# Patient Record
Sex: MALE | Race: BLACK OR AFRICAN AMERICAN | NOT HISPANIC OR LATINO | Employment: OTHER | URBAN - METROPOLITAN AREA
[De-identification: names, ages, dates, MRNs, and addresses within clinical notes are randomized per-mention and may not be internally consistent; named-entity substitution may affect disease eponyms.]

---

## 2019-09-23 ENCOUNTER — OFFICE VISIT (OUTPATIENT)
Dept: PHYSICAL MEDICINE AND REHAB | Facility: CLINIC | Age: 69
End: 2019-09-23
Payer: MEDICARE

## 2019-09-23 VITALS
OXYGEN SATURATION: 91 % | WEIGHT: 273 LBS | TEMPERATURE: 97 F | SYSTOLIC BLOOD PRESSURE: 130 MMHG | HEART RATE: 62 BPM | HEIGHT: 68 IN | BODY MASS INDEX: 41.37 KG/M2 | DIASTOLIC BLOOD PRESSURE: 80 MMHG

## 2019-09-23 DIAGNOSIS — E11.65 TYPE 2 DIABETES MELLITUS WITH HYPERGLYCEMIA, WITHOUT LONG-TERM CURRENT USE OF INSULIN: ICD-10-CM

## 2019-09-23 DIAGNOSIS — Z74.09 IMPAIRED MOBILITY: ICD-10-CM

## 2019-09-23 DIAGNOSIS — I50.9 CONGESTIVE HEART FAILURE, UNSPECIFIED HF CHRONICITY, UNSPECIFIED HEART FAILURE TYPE: ICD-10-CM

## 2019-09-23 DIAGNOSIS — I69.30 LATE EFFECT OF ISCHEMIC CEREBRAL STROKE: ICD-10-CM

## 2019-09-23 DIAGNOSIS — I25.10 CORONARY ARTERY DISEASE INVOLVING NATIVE CORONARY ARTERY, ANGINA PRESENCE UNSPECIFIED, UNSPECIFIED WHETHER NATIVE OR TRANSPLANTED HEART: ICD-10-CM

## 2019-09-23 DIAGNOSIS — R47.1 DYSARTHRIA: ICD-10-CM

## 2019-09-23 DIAGNOSIS — Z91.81 AT LOW RISK FOR FALL: ICD-10-CM

## 2019-09-23 DIAGNOSIS — R53.1 ASTHENIA: Primary | ICD-10-CM

## 2019-09-23 PROCEDURE — 99213 OFFICE O/P EST LOW 20 MIN: CPT | Mod: S$GLB,,, | Performed by: NURSE PRACTITIONER

## 2019-09-23 PROCEDURE — 99213 PR OFFICE/OUTPT VISIT, EST, LEVL III, 20-29 MIN: ICD-10-PCS | Mod: S$GLB,,, | Performed by: NURSE PRACTITIONER

## 2019-09-23 NOTE — PROGRESS NOTES
Subjective:       Patient ID: Bethany Levy Jr. is a 69 y.o. male.    Chief Complaint: Follow-up (Went 8/13/19 Er did procedure blood pressure dropped and cought an infection was sent straight to ICU. Got out of  rehab 9/11/2019. )    HPI     69-year-old black male admitted to [facility] 8/14/2019 with complaints of increasing shortness of breath, abdominal pain.  Chest x-ray revealed bilateral basilar infiltrates (pneumonia) in addition to sepsis/septic shock.  Required ICU management.  Also developed acute kidney injury seen by nephrology Dr. Olvera.  Alex Guaman followed for pulmonology and Dr. Collins Ospina for cardiology.  Evaluated by GI Dr. Onofre with the findings of gastritis/duodenitis comorbidities include atrial fibrillation, hypertension, chronic obstructive pulmonary disease, gastroesophageal reflux disease, diabetes mellitus type 2 with hyperglycemia, CAM/CKD history of dysphagia, obstructive sleep apnea (CPAP) asthma and chronic lymphedema bilateral lower extremities..  History of possible remote stroke.  He was able to participate with PT OT speech therapy interventions, struggled to regain prior level of function.  Requires continued intensive interdisciplinary inpatient rehabilitation with close medical monitoring during remobilization. He has since transitioned to home. Working w/ PT/OT thru [facility]. Regaining strength. Able to complete all ADLs independently since his discharge.     Review of Systems   Constitutional: Negative for activity change, appetite change, chills, fatigue and fever.   Respiratory: Negative for apnea, cough, chest tightness, shortness of breath and wheezing.    Cardiovascular: Positive for leg swelling. Negative for chest pain and palpitations.   Gastrointestinal: Negative for abdominal distention, abdominal pain, blood in stool, constipation, diarrhea and vomiting.   Genitourinary: Negative for difficulty urinating, dysuria, flank pain, frequency  and urgency.   Musculoskeletal: Negative for arthralgias, back pain, gait problem, joint swelling and neck pain.   Skin: Negative for color change, pallor and rash.   Neurological: Negative for dizziness, tremors, syncope, weakness, light-headedness and numbness.   Hematological: Negative for adenopathy. Does not bruise/bleed easily.   Psychiatric/Behavioral: Negative for agitation, confusion and sleep disturbance. The patient is not nervous/anxious.        Objective:      Physical Exam   Constitutional: He is oriented to person, place, and time. Vital signs are normal. He appears well-developed and well-nourished.   HENT:   Head: Normocephalic and atraumatic.   Mouth/Throat: Oropharynx is clear and moist.   Eyes: Pupils are equal, round, and reactive to light. Conjunctivae are normal.   Neck: Trachea normal and normal range of motion. Neck supple. Carotid bruit is not present.   Cardiovascular: Normal rate, regular rhythm and normal heart sounds.   Pulmonary/Chest: Effort normal and breath sounds normal.   Abdominal: Soft. Bowel sounds are normal.   Musculoskeletal: Normal range of motion. He exhibits edema (2+ bilateral lower extrimities).   Neurological: He is alert and oriented to person, place, and time. He displays no atrophy and no tremor. He exhibits normal muscle tone. Coordination normal.   Marked dysarthria   Skin: Skin is warm, dry and intact.   Psychiatric: He has a normal mood and affect. His speech is normal and behavior is normal. Judgment normal.       Assessment:       1. Asthenia    2. Late effect of ischemic cerebral stroke    3. Dysarthria    4. Coronary artery disease involving native coronary artery, angina presence unspecified, unspecified whether native or transplanted heart    5. Type 2 diabetes mellitus with hyperglycemia, without long-term current use of insulin    6. Congestive heart failure, unspecified HF chronicity, unspecified heart failure type    7. Impaired mobility    8. At low  risk for fall        Plan:       PROBLEM LIST     Bethany was seen today for follow-up.    Diagnoses and all orders for this visit:    Asthenia    Late effect of ischemic cerebral stroke    Dysarthria    Coronary artery disease involving native coronary artery, angina presence unspecified, unspecified whether native or transplanted heart    Type 2 diabetes mellitus with hyperglycemia, without long-term current use of insulin    Congestive heart failure, unspecified HF chronicity, unspecified heart failure type    Impaired mobility    At low risk for fall    Improvement in strength and physical capacity. Continue PT/OT through [facility]. Contact PCP regarding LE swelling which could be exacerbation of CHF.

## 2020-01-13 ENCOUNTER — OFFICE VISIT (OUTPATIENT)
Dept: UROLOGY | Facility: CLINIC | Age: 70
End: 2020-01-13
Payer: MEDICARE

## 2020-01-13 VITALS
BODY MASS INDEX: 42.74 KG/M2 | HEIGHT: 68 IN | RESPIRATION RATE: 18 BRPM | SYSTOLIC BLOOD PRESSURE: 141 MMHG | HEART RATE: 70 BPM | WEIGHT: 282 LBS | DIASTOLIC BLOOD PRESSURE: 76 MMHG

## 2020-01-13 DIAGNOSIS — N40.1 BPH WITH OBSTRUCTION/LOWER URINARY TRACT SYMPTOMS: ICD-10-CM

## 2020-01-13 DIAGNOSIS — N50.89 SCROTAL EDEMA: Primary | ICD-10-CM

## 2020-01-13 DIAGNOSIS — N13.8 BPH WITH OBSTRUCTION/LOWER URINARY TRACT SYMPTOMS: ICD-10-CM

## 2020-01-13 PROCEDURE — 99214 PR OFFICE/OUTPT VISIT, EST, LEVL IV, 30-39 MIN: ICD-10-PCS | Mod: S$GLB,,, | Performed by: NURSE PRACTITIONER

## 2020-01-13 PROCEDURE — 99214 OFFICE O/P EST MOD 30 MIN: CPT | Mod: S$GLB,,, | Performed by: NURSE PRACTITIONER

## 2020-01-13 PROCEDURE — 1159F MED LIST DOCD IN RCRD: CPT | Mod: S$GLB,,, | Performed by: NURSE PRACTITIONER

## 2020-01-13 PROCEDURE — 1159F PR MEDICATION LIST DOCUMENTED IN MEDICAL RECORD: ICD-10-PCS | Mod: S$GLB,,, | Performed by: NURSE PRACTITIONER

## 2020-01-13 PROCEDURE — 1126F PR PAIN SEVERITY QUANTIFIED, NO PAIN PRESENT: ICD-10-PCS | Mod: S$GLB,,, | Performed by: NURSE PRACTITIONER

## 2020-01-13 PROCEDURE — 1126F AMNT PAIN NOTED NONE PRSNT: CPT | Mod: S$GLB,,, | Performed by: NURSE PRACTITIONER

## 2020-01-13 RX ORDER — TAMSULOSIN HYDROCHLORIDE 0.4 MG/1
0.4 CAPSULE ORAL DAILY
COMMUNITY
End: 2020-09-15 | Stop reason: SDUPTHER

## 2020-01-13 NOTE — PROGRESS NOTES
Chief Complaint:   Chief Complaint   Patient presents with    Other     swollen groin area/no pain but has been going on  for a while       HPI:  69-year-old  male known to Dr. Nelson who presents with complaints of swollen groin area.  Patient states that it may be related to extra fluid as he has been started on diuretic therapy and has a history of chronic lower extremity edema. Since his last visit with us, he was hospitalized for sepsis due to pneumonia requiring ICU management and short stay in rehab for PT/OT.  He is now back at home living alone in performing ADLs independently.    Patient has BPH with lower urinary tract symptoms, maintained on Flomax.  He feels like he empties his bladder completely.  LADI was performed in April 2019 and was benign.  PSA was also obtained at that time and it was 0.6.  He denies any other urinary complaints today.    Allergies:  Patient has no known allergies.    Medications:  has a current medication list which includes the following prescription(s): tamsulosin, ammonium lactate, apixaban, atorvastatin, benzonatate, diltiazem, metformin, metoprolol tartrate, spironolactone, and torsemide.    Review of Systems:  General: No fever, chills, vision changes, dizziness, weakness, fatigue, unexplained weight loss, confusion, or mood swings.  Skin: No rashes, itching, or changes in color/texture of skin.  Chest: Denies JORGE, cyanosis, wheezing, cough, and sputum production.  Abdomen: Appetite fine. Denies diarrhea, abdominal pain, hematemesis, or blood in stool.  Musculoskeletal: No joint stiffness or swelling. Denies back pain.  : As above.  All other review of systems negative.    PMH:   has a past medical history of Atrial fibrillation, CHF (congestive heart failure), COPD (chronic obstructive pulmonary disease), Coronary artery disease, Diabetes mellitus, GERD (gastroesophageal reflux disease), Hypertension, and PVD (peripheral vascular disease).    PSH:    has a past surgical history that includes Cholecystectomy.    FamHx: Family history is unknown by patient.    SocHx:  reports that he has never smoked. He has never used smokeless tobacco. He reports that he does not drink alcohol or use drugs.      Physical Exam:  Vitals:    01/13/20 1426   BP: (!) 141/76   Pulse: 70   Resp: 18     General: AAOx3, no apparent distress, expressive aphasia intermittently  Neck: supple, no masses, normal thyroid  Lungs: normal inspiration, no use of accessory muscles  Heart: regular rate and rhythm, no arrhythmias  Abdomen: soft, NT, ND, no masses, no hernias, no hepatosplenomegaly  Lymphatic: no unusually enlarged or tender lymph nodes  Skin: warm and dry, no jaundice.  Ext: LLE 3+ pitting edema, RLE 2+ pitting edema, ambulating independently.  : test desc benjamin, scrotal swelling, no abnormalities of epididymus    Labs/Studies: none    Impression/Plan:   Scrotal edema  Comments:  associated with chronic lower extremity edema, educated, instructed to wear supportive undergarments, continue meds as prescribed    BPH with obstruction/lower urinary tract symptoms  Comments:  stable, maintained on Flomax, LADI and PSA due April 2020        Follow up in about 6 months (around 7/13/2020).

## 2020-07-13 ENCOUNTER — OFFICE VISIT (OUTPATIENT)
Dept: UROLOGY | Facility: CLINIC | Age: 70
End: 2020-07-13
Payer: MEDICARE

## 2020-07-13 VITALS
DIASTOLIC BLOOD PRESSURE: 78 MMHG | HEIGHT: 68 IN | BODY MASS INDEX: 42.74 KG/M2 | SYSTOLIC BLOOD PRESSURE: 165 MMHG | HEART RATE: 55 BPM | RESPIRATION RATE: 18 BRPM | WEIGHT: 282 LBS

## 2020-07-13 DIAGNOSIS — N13.8 BPH WITH OBSTRUCTION/LOWER URINARY TRACT SYMPTOMS: Primary | ICD-10-CM

## 2020-07-13 DIAGNOSIS — N40.1 BPH WITH OBSTRUCTION/LOWER URINARY TRACT SYMPTOMS: Primary | ICD-10-CM

## 2020-07-13 DIAGNOSIS — N50.89 SCROTAL SWELLING: ICD-10-CM

## 2020-07-13 LAB — PSA, DIAGNOSTIC: 0.59 NG/ML (ref 0–4)

## 2020-07-13 PROCEDURE — 99213 OFFICE O/P EST LOW 20 MIN: CPT | Mod: S$GLB,,, | Performed by: SPECIALIST

## 2020-07-13 PROCEDURE — 99213 PR OFFICE/OUTPT VISIT, EST, LEVL III, 20-29 MIN: ICD-10-PCS | Mod: S$GLB,,, | Performed by: SPECIALIST

## 2020-07-13 RX ORDER — TEMAZEPAM 15 MG/1
15 CAPSULE ORAL NIGHTLY
COMMUNITY
Start: 2020-06-10

## 2020-07-13 RX ORDER — DILTIAZEM HYDROCHLORIDE 240 MG/1
CAPSULE, EXTENDED RELEASE ORAL
COMMUNITY
Start: 2020-06-23 | End: 2021-07-16

## 2020-07-13 RX ORDER — IPRATROPIUM BROMIDE AND ALBUTEROL SULFATE 2.5; .5 MG/3ML; MG/3ML
SOLUTION RESPIRATORY (INHALATION)
COMMUNITY
Start: 2020-07-05

## 2020-07-14 ENCOUNTER — TELEPHONE (OUTPATIENT)
Dept: UROLOGY | Facility: CLINIC | Age: 70
End: 2020-07-14

## 2020-07-14 NOTE — TELEPHONE ENCOUNTER
Pt contacted. N/A and msg left on VM to return call.     ----- Message from Hema Nelson MD sent at 7/14/2020  3:25 PM CDT -----  PSA of 0.59 ng/mL is good and stable for his age.  Inform him of the results.  Remind him to continue to do scrotal elevation when he is sitting down or laying down and to continue to wear really tight undergarments when he is walking around.

## 2020-07-15 ENCOUNTER — TELEPHONE (OUTPATIENT)
Dept: UROLOGY | Facility: CLINIC | Age: 70
End: 2020-07-15

## 2020-07-15 NOTE — TELEPHONE ENCOUNTER
Spoke with pt and advised of PSA result 0.59 which is stable and good for his age per MIKE. Nurse also advised pt to wear tight fitting underwear while walking around and to continue with scrotal evaluation. Pt verbalized understanding. NB

## 2020-09-15 ENCOUNTER — TELEPHONE (OUTPATIENT)
Dept: UROLOGY | Facility: CLINIC | Age: 70
End: 2020-09-15

## 2020-09-15 DIAGNOSIS — N40.1 BPH WITH OBSTRUCTION/LOWER URINARY TRACT SYMPTOMS: Primary | ICD-10-CM

## 2020-09-15 DIAGNOSIS — N13.8 BPH WITH OBSTRUCTION/LOWER URINARY TRACT SYMPTOMS: Primary | ICD-10-CM

## 2020-09-15 RX ORDER — TAMSULOSIN HYDROCHLORIDE 0.4 MG/1
0.4 CAPSULE ORAL DAILY
Qty: 30 CAPSULE | Refills: 11 | Status: CANCELLED | OUTPATIENT
Start: 2020-09-15 | End: 2020-10-15

## 2020-09-15 RX ORDER — TAMSULOSIN HYDROCHLORIDE 0.4 MG/1
0.4 CAPSULE ORAL DAILY
Qty: 30 CAPSULE | Refills: 5 | Status: SHIPPED | OUTPATIENT
Start: 2020-09-15 | End: 2021-11-01

## 2020-09-15 NOTE — TELEPHONE ENCOUNTER
----- Message from Fabián Blankenship sent at 9/15/2020  9:42 AM CDT -----  Regarding: Medication question  Please call [facility] to discuss a medication question he has about Flomax [phone].

## 2020-09-15 NOTE — TELEPHONE ENCOUNTER
MA spoke with patient, he was admitting to urinary frequency and out of his Flomax, will send refill to updated pharmacy as he is evacuated.

## 2021-01-14 ENCOUNTER — OFFICE VISIT (OUTPATIENT)
Dept: UROLOGY | Facility: CLINIC | Age: 71
End: 2021-01-14
Payer: MEDICARE

## 2021-01-14 VITALS
HEIGHT: 68 IN | WEIGHT: 280 LBS | SYSTOLIC BLOOD PRESSURE: 136 MMHG | HEART RATE: 85 BPM | BODY MASS INDEX: 42.44 KG/M2 | DIASTOLIC BLOOD PRESSURE: 72 MMHG

## 2021-01-14 DIAGNOSIS — N13.8 BPH WITH OBSTRUCTION/LOWER URINARY TRACT SYMPTOMS: Primary | ICD-10-CM

## 2021-01-14 DIAGNOSIS — N50.89 SCROTAL SWELLING: ICD-10-CM

## 2021-01-14 DIAGNOSIS — N40.1 BPH WITH OBSTRUCTION/LOWER URINARY TRACT SYMPTOMS: Primary | ICD-10-CM

## 2021-01-14 DIAGNOSIS — R60.0 LOWER EXTREMITY EDEMA: ICD-10-CM

## 2021-01-14 LAB — POC RESIDUAL URINE VOLUME: 40 ML (ref 0–100)

## 2021-01-14 PROCEDURE — 99213 PR OFFICE/OUTPT VISIT, EST, LEVL III, 20-29 MIN: ICD-10-PCS | Mod: S$GLB,,, | Performed by: SPECIALIST

## 2021-01-14 PROCEDURE — 99213 OFFICE O/P EST LOW 20 MIN: CPT | Mod: S$GLB,,, | Performed by: SPECIALIST

## 2021-01-14 PROCEDURE — 51798 POCT BLADDER SCAN: ICD-10-PCS | Mod: S$GLB,,, | Performed by: SPECIALIST

## 2021-01-14 PROCEDURE — 51798 US URINE CAPACITY MEASURE: CPT | Mod: S$GLB,,, | Performed by: SPECIALIST

## 2021-01-14 RX ORDER — LOSARTAN POTASSIUM 50 MG/1
50 TABLET ORAL DAILY
COMMUNITY
Start: 2020-12-17

## 2021-01-14 RX ORDER — ESCITALOPRAM OXALATE 10 MG/1
10 TABLET ORAL DAILY
COMMUNITY
Start: 2020-12-26

## 2021-01-14 RX ORDER — TRAZODONE HYDROCHLORIDE 50 MG/1
50 TABLET ORAL NIGHTLY
COMMUNITY
Start: 2020-12-22

## 2021-01-14 RX ORDER — FUROSEMIDE 40 MG/1
40 TABLET ORAL DAILY
COMMUNITY
Start: 2020-12-23

## 2021-01-14 RX ORDER — ZOLPIDEM TARTRATE 10 MG/1
10 TABLET ORAL NIGHTLY
COMMUNITY
Start: 2020-11-08

## 2021-01-14 RX ORDER — LEVOFLOXACIN 500 MG/1
500 TABLET, FILM COATED ORAL DAILY
COMMUNITY
Start: 2020-12-17

## 2021-07-16 ENCOUNTER — OFFICE VISIT (OUTPATIENT)
Dept: UROLOGY | Facility: CLINIC | Age: 71
End: 2021-07-16
Payer: MEDICARE

## 2021-07-16 VITALS
DIASTOLIC BLOOD PRESSURE: 68 MMHG | SYSTOLIC BLOOD PRESSURE: 147 MMHG | BODY MASS INDEX: 40.16 KG/M2 | HEART RATE: 85 BPM | WEIGHT: 265 LBS | HEIGHT: 68 IN

## 2021-07-16 DIAGNOSIS — N52.9 ERECTILE DYSFUNCTION, UNSPECIFIED ERECTILE DYSFUNCTION TYPE: ICD-10-CM

## 2021-07-16 DIAGNOSIS — N40.1 BPH WITH OBSTRUCTION/LOWER URINARY TRACT SYMPTOMS: Primary | ICD-10-CM

## 2021-07-16 DIAGNOSIS — N50.89 SCROTAL SWELLING: ICD-10-CM

## 2021-07-16 DIAGNOSIS — N13.8 BPH WITH OBSTRUCTION/LOWER URINARY TRACT SYMPTOMS: Primary | ICD-10-CM

## 2021-07-16 LAB — PSA, DIAGNOSTIC: 1.45 NG/ML (ref 0–4)

## 2021-07-16 PROCEDURE — 99213 OFFICE O/P EST LOW 20 MIN: CPT | Mod: S$GLB,,, | Performed by: NURSE PRACTITIONER

## 2021-07-16 PROCEDURE — 36415 PR COLLECTION VENOUS BLOOD,VENIPUNCTURE: ICD-10-PCS | Mod: S$GLB,,, | Performed by: NURSE PRACTITIONER

## 2021-07-16 PROCEDURE — 51798 PR MEAS,POST-VOID RES,US,NON-IMAGING: ICD-10-PCS | Mod: S$GLB,,, | Performed by: NURSE PRACTITIONER

## 2021-07-16 PROCEDURE — 51798 US URINE CAPACITY MEASURE: CPT | Mod: S$GLB,,, | Performed by: NURSE PRACTITIONER

## 2021-07-16 PROCEDURE — 36415 COLL VENOUS BLD VENIPUNCTURE: CPT | Mod: S$GLB,,, | Performed by: NURSE PRACTITIONER

## 2021-07-16 PROCEDURE — 99213 PR OFFICE/OUTPT VISIT, EST, LEVL III, 20-29 MIN: ICD-10-PCS | Mod: S$GLB,,, | Performed by: NURSE PRACTITIONER

## 2021-07-16 RX ORDER — CLOPIDOGREL BISULFATE 75 MG/1
75 TABLET ORAL DAILY
COMMUNITY
Start: 2021-06-13

## 2021-07-16 RX ORDER — DILTIAZEM HYDROCHLORIDE 240 MG/1
240 CAPSULE, COATED, EXTENDED RELEASE ORAL EVERY MORNING
COMMUNITY
Start: 2021-06-17

## 2021-07-16 RX ORDER — METOLAZONE 5 MG/1
TABLET ORAL
COMMUNITY
Start: 2021-06-22

## 2021-07-16 RX ORDER — POTASSIUM CHLORIDE 20 MEQ/1
20 TABLET, EXTENDED RELEASE ORAL 2 TIMES DAILY
COMMUNITY
Start: 2021-06-13

## 2021-07-19 ENCOUNTER — TELEPHONE (OUTPATIENT)
Dept: UROLOGY | Facility: CLINIC | Age: 71
End: 2021-07-19

## 2021-08-02 ENCOUNTER — TELEPHONE (OUTPATIENT)
Dept: UROLOGY | Facility: CLINIC | Age: 71
End: 2021-08-02

## 2021-08-03 ENCOUNTER — PROCEDURE VISIT (OUTPATIENT)
Dept: UROLOGY | Facility: CLINIC | Age: 71
End: 2021-08-03
Payer: MEDICARE

## 2021-08-03 VITALS
OXYGEN SATURATION: 92 % | HEIGHT: 68 IN | HEART RATE: 85 BPM | SYSTOLIC BLOOD PRESSURE: 195 MMHG | BODY MASS INDEX: 40.84 KG/M2 | RESPIRATION RATE: 18 BRPM | DIASTOLIC BLOOD PRESSURE: 109 MMHG | WEIGHT: 269.5 LBS

## 2021-08-03 DIAGNOSIS — N40.1 BPH WITH OBSTRUCTION/LOWER URINARY TRACT SYMPTOMS: Primary | ICD-10-CM

## 2021-08-03 DIAGNOSIS — N13.8 BPH WITH OBSTRUCTION/LOWER URINARY TRACT SYMPTOMS: ICD-10-CM

## 2021-08-03 DIAGNOSIS — N40.1 BPH WITH OBSTRUCTION/LOWER URINARY TRACT SYMPTOMS: ICD-10-CM

## 2021-08-03 DIAGNOSIS — N13.8 BPH WITH OBSTRUCTION/LOWER URINARY TRACT SYMPTOMS: Primary | ICD-10-CM

## 2021-08-03 PROCEDURE — 52000 CYSTOURETHROSCOPY: CPT | Mod: S$GLB,,, | Performed by: UROLOGY

## 2021-08-03 PROCEDURE — 52000 CYSTOSCOPY: ICD-10-PCS | Mod: S$GLB,,, | Performed by: UROLOGY

## 2021-08-03 RX ORDER — FINASTERIDE 5 MG/1
5 TABLET, FILM COATED ORAL DAILY
Qty: 30 TABLET | Refills: 11 | Status: SHIPPED | OUTPATIENT
Start: 2021-08-03 | End: 2022-08-03

## 2021-08-03 RX ORDER — CIPROFLOXACIN 500 MG/1
500 TABLET ORAL
Status: COMPLETED | OUTPATIENT
Start: 2021-08-03 | End: 2021-08-03

## 2021-08-03 RX ADMIN — CIPROFLOXACIN 500 MG: 500 TABLET ORAL at 04:08

## 2024-09-21 NOTE — PROGRESS NOTES
Subjective:       Patient ID: Bethany Levy Jr. is a 69 y.o. male.    Chief Complaint: Follow-up (6mth f/u), Groin Pain (swollen approx 6mths+), and Urinary Frequency (approx over one year)      HPI:  69-year-old man who is here for six-month follow-up.  He has BPH and lower tract symptoms.  He is being managed with tamsulosin.  He is do a digital rectal exam and PSA check today.  Denies any blood in his urine.  Denies any bothersome LUTS    He is reporting scrotal swelling.  Of note he also has lower extremity edema.  He also has abdominal distention.  He is in the chronic state of fluid retention and he is dependent on Lasix.    Past Medical History:   Past Medical History:   Diagnosis Date    Atrial fibrillation     CHF (congestive heart failure)     COPD (chronic obstructive pulmonary disease)     Coronary artery disease     Diabetes mellitus     GERD (gastroesophageal reflux disease)     Hypertension     PVD (peripheral vascular disease)        Past Surgical Historical:   Past Surgical History:   Procedure Laterality Date    CHOLECYSTECTOMY          Medications:   Medication List with Changes/Refills   Current Medications    ALBUTEROL-IPRATROPIUM (DUO-NEB) 2.5 MG-0.5 MG/3 ML NEBULIZER SOLUTION    USE 1 SOLUTION IN NEBULIZER THREE TIMES DAILY    AMMONIUM LACTATE (LAC-HYDRIN) 12 % LOTION    Apply topically 2 (two) times daily as needed for Dry Skin.    APIXABAN 5 MG TAB    Take 1 tablet (5 mg total) by mouth 2 (two) times daily.    ATORVASTATIN (LIPITOR) 40 MG TABLET    Take 1 tablet (40 mg total) by mouth once daily.    BENZONATATE (TESSALON) 200 MG CAPSULE    Take 200 mg by mouth 3 (three) times daily as needed for Cough.    DILTIAZEM (CARDIZEM CD) 120 MG CP24    Take 1 capsule (120 mg total) by mouth once daily.    DILTIAZEM (TIAZAC) 240 MG CS24    TAKE 1 CAPSULE BY MOUTH ONCE DAILY IN THE MORNING    METFORMIN (GLUCOPHAGE-XR) 750 MG 24 HR TABLET    Take 750 mg by mouth daily with breakfast.     METOPROLOL TARTRATE (LOPRESSOR) 25 MG TABLET    Take 1 tablet (25 mg total) by mouth 2 (two) times daily.    SPIRONOLACTONE (ALDACTONE) 50 MG TABLET    Take 50 mg by mouth once daily.    TAMSULOSIN (FLOMAX) 0.4 MG CAP    Take 0.4 mg by mouth once daily.    TEMAZEPAM (RESTORIL) 15 MG CAP    Take 15 mg by mouth every evening.    TORSEMIDE (DEMADEX) 10 MG TAB    Take 3 tablets (30 mg total) by mouth once daily.        Past Social History:   Social History     Socioeconomic History    Marital status:      Spouse name: Not on file    Number of children: Not on file    Years of education: Not on file    Highest education level: Not on file   Occupational History    Not on file   Social Needs    Financial resource strain: Not on file    Food insecurity     Worry: Not on file     Inability: Not on file    Transportation needs     Medical: Not on file     Non-medical: Not on file   Tobacco Use    Smoking status: Never Smoker    Smokeless tobacco: Never Used   Substance and Sexual Activity    Alcohol use: No    Drug use: No    Sexual activity: Not on file   Lifestyle    Physical activity     Days per week: Not on file     Minutes per session: Not on file    Stress: Not on file   Relationships    Social connections     Talks on phone: Not on file     Gets together: Not on file     Attends [employer] service: Not on file     Active member of club or organization: Not on file     Attends meetings of clubs or organizations: Not on file     Relationship status: Not on file   Other Topics Concern    Not on file   Social History Narrative    Not on file       Allergies: Review of patient's allergies indicates:  No Known Allergies     Family History:   Family History   Family history unknown: Yes        Review of Systems:   systems reviewed and notable for scrotal swelling as well as BPH with lower urinary tract symptoms  All other systems were reviewed Neg except as stated in the HPI    Physical  Exam:  General: A&Ox3. No apparent distress. No deformities.  Neck: No masses. Normal thyroid.  Lungs: normal inspiration. No use of accessory muscles.  Heart: normal pulse. No arrhythmias.  Abdomen: Soft. NT. No masses. No hernias. No hepatosplenomegaly.  Abdomen is distended with tympany  Lymphatic: Neck and groin nodes negative.  Skin: The skin is warm and dry. No jaundice.  Neurology: Cranial nerves 2-12 crossly intact, no focal weaknesses, no sensation deficits, no motor deficits  Ext: No clubbing, cyanosis, there is lower extremity edema.  : External genitalia normal. Circumcised.  Meatus is adequate and in the right position.  Phallus is normal.  Scrotum is enlarged.  Scrotal wall is not think he has free fluid a bilateral him scrotum.  Digital rectal exam revealed normal sphincter tone, prostate is soft, smooth, no nodules, finger estimation least 30 g    Assessment/Plan:       69-year-old man with BPH and lower tract symptoms who has scrotal edema from what I believe could be communicating hydroceles.    1.  Patient is in a chronic state of fluid retention dependent on Lasix.  He has free abdominal fluid.  I recommend scrotal elevation when he sits down and a recommend high wearing tight undergarments.  2.  LADI is been accomplished today  3.  PSA will be obtained today will give him a call with the results  4. Return to clinic in 6 months    Problem List Items Addressed This Visit     None      Visit Diagnoses     BPH with obstruction/lower urinary tract symptoms    -  Primary    Relevant Orders    POCT Bladder Scan    POCT Urinalysis (w/Micro Option)    Prostate Specific Antigen, Diagnostic    Scrotal swelling                  normal